# Patient Record
Sex: FEMALE | Race: WHITE
[De-identification: names, ages, dates, MRNs, and addresses within clinical notes are randomized per-mention and may not be internally consistent; named-entity substitution may affect disease eponyms.]

---

## 2018-01-30 ENCOUNTER — HOSPITAL ENCOUNTER (EMERGENCY)
Dept: HOSPITAL 80 - FED | Age: 28
Discharge: HOME | End: 2018-01-30
Payer: COMMERCIAL

## 2018-01-30 VITALS — DIASTOLIC BLOOD PRESSURE: 63 MMHG | HEART RATE: 68 BPM | SYSTOLIC BLOOD PRESSURE: 102 MMHG

## 2018-01-30 VITALS — RESPIRATION RATE: 16 BRPM | OXYGEN SATURATION: 97 % | TEMPERATURE: 98.2 F

## 2018-01-30 DIAGNOSIS — R42: Primary | ICD-10-CM

## 2018-01-30 DIAGNOSIS — E86.9: ICD-10-CM

## 2018-01-30 LAB
INR PPP: 0.98 (ref 0.83–1.16)
PLATELET # BLD: 196 10^3/UL (ref 150–400)
PROTHROMBIN TIME: 13.2 SEC (ref 12–15)

## 2018-01-30 NOTE — CPEKG
Heart Rate: 59

RR Interval: 1017

P-R Interval: 156

QRSD Interval: 82

QT Interval: 416

QTC Interval: 413

P Axis: 55

QRS Axis: 60

T Wave Axis: 44

EKG Severity - NORMAL ECG -

EKG Impression: SINUS RHYTHM

Electronically Signed By: Carl Gallo 31-Jan-2018 07:56:38

## 2018-01-30 NOTE — EDPHY
H & P


Stated Complaint: dizziness, visual changes, "tingling" nausea


HPI/ROS: 





HPI





CHIEF COMPLAINT:  Lightheadedness, I felt like was going to pass out





HISTORY OF PRESENT ILLNESS:  This patient is a  very pleasant 27-year-old 

female she has a history of schizoaffective disorder does not take any 

medications daily she presents emergency room states she feels very 

lightheaded.  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 

she got lightheaded.  She felt like she was going to pass out.  Denies any 

significant chest pain or shortness of breath. She states her vision got foggy 

when this happened.  She did not collapse.  There was no syncopal episode.  She 

has not been sick recently.  She denies any shortness of breath or pleuritic 

pain.  Denies palpitations.  She decided come the emergency room for evaluation 

of this this happened 2 hr ago.  She felt maybe she was dehydrated.  Otherwise 

not been ill.  Denies any nausea vomiting or diarrhea.





Past Medical History:  Denies significant medical history except for 

schizoaffective disorder





Past Surgical History:  Denies significant surgical history





Social History:  Denies daily use drugs alcohol tobacco products.





Family History:  Noncontributory








ROS   


REVIEW OF SYSTEMS:


A comprehensive 10 point review of systems is otherwise negative aside from 

elements mentioned in the history of present illness.








Exam   


Constitutional  appears well nontoxic, triage nursing summary reviewed, vital 

signs reviewed, awake/alert. 


Eyes   normal conjunctivae and sclera, EOMI, PERRLA. 


HENT   normal inspection, atraumatic, moist mucus membranes, no epistaxis, neck 

supple/ no meningismus, no raccoon eyes. 


Respiratory   clear to auscultation bilaterally, normal breath sounds, no 

respiratory distress, no wheezing. 


Cardiovascular   rate normal, regular rhythm, no murmur, no edema, distal 

pulses normal. 


Gastrointestinal   soft, non-tender, no rebound, no guarding, normal bowel 

sounds, no distension, no pulsatile mass. 


Genitourinary   no CVA tenderness. 


Musculoskeletal  no midline vertebral tenderness, full range of motion, no calf 

swelling, no tenderness of extremities, no meningismus, good pulses, 

neurovascularly intact.


Skin   pink, warm, & dry, no rash, skin atraumatic. 


Neurologic   awake, alert and oriented x 3, AAOx3, moves all 4 extremities 

equally, motor intact, sensory intact, CN II-XII intact, normal cerebellar, 

normal vision, normal speech. 


Psychiatric   normal mood/affect. 


Heme/Lymph/Immune   no lymphadenopathy.





Differential Diagnosis:  Includes but is not limited to in a particular order 

dehydration, electrolyte disturbance, cardiac arrhythmia, orthostatic 

hypotension





Medical Decision Making:  Plan for this patient IV establishment IV fluid bolus

, check EKG basic blood work and electrolytes, pregnancy test, and re-evaluate.

  Check orthostatic vital signs.





Re-evaluation:








EKG INTERPRETATION BY ME ON RECORD IN ev-social SYSTEM.  IMPRESSION time of 

EKG 2216, sinus rhythm rate of 59 no signs of acute ischemia or signs of 

cardiac arrhythmia.  Unremarkable EKG.





Orthostatic vital signs reviewed negative.





2326:  Re-examination at this time this patient is resting comfortably.  She 

has no complaints. Blood work is unremarkable. Vital signs are stable. 

Electrolytes are appropriate.  I have not found anything significantly or 

acutely wrong in her workup here for lightheadedness.  She is feeling better 

after IV fluids.  She ambulated well throughout the emergency room without any 

difficulty she denies any chest pain or shortness of breath denies 

lightheadedness or dizziness.  She denies feeling like she is going to pass out.





I have went over return precautions with her she understands return emergency 

room if she develops any worsening symptoms questions or concerns includes 

syncope, chest pain, shortness of breath, lightheadedness.


Source: Patient





- Personal History


LMP (Females 10-55): 8-14 Days Ago


Current Tetanus Diphtheria and Acellular Pertussis (TDAP): Yes





- Medical/Surgical History


Hx Asthma: No


Hx Chronic Respiratory Disease: No


Hx Diabetes: No


Hx Cardiac Disease: No


Hx Renal Disease: No


Hx Cirrhosis: No


Hx Alcoholism: No


Hx HIV/AIDS: No


Hx Splenectomy or Spleen Trauma: No


Other PMH: PTSD, Major Depresive Disorder, appendectomy,





- Social History


Smoking Status: Never smoked


Constitutional: 


 Initial Vital Signs











Temperature (C)  36.8 C   01/30/18 21:48


 


Heart Rate  67   01/30/18 21:48


 


Respiratory Rate  16   01/30/18 21:48


 


Blood Pressure  104/66   01/30/18 21:48


 


O2 Sat (%)  97   01/30/18 21:48








 











O2 Delivery Mode               Room Air














Allergies/Adverse Reactions: 


 





No Known Allergies Allergy (Unverified 01/30/18 21:48)


 








Home Medications: 














 Medication  Instructions  Recorded


 


NK [No Known Home Meds]  01/30/18














Medical Decision Making





- Diagnostics


Imaging Results: 


 Imaging Impressions





Chest X-Ray  01/30/18 22:09


Impression:


 


No acute thoracic abnormality. 


 














- Data Points


Laboratory Results: 


 Laboratory Results





 01/30/18 22:18 





 01/30/18 22:18 





 











  01/30/18 01/30/18 01/30/18





  22:18 22:18 22:18


 


WBC      





    


 


RBC      





    


 


Hgb      





    


 


Hct      





    


 


MCV      





    


 


MCH      





    


 


MCHC      





    


 


RDW      





    


 


Plt Count      





    


 


MPV      





    


 


Neut % (Auto)      





    


 


Lymph % (Auto)      





    


 


Mono % (Auto)      





    


 


Eos % (Auto)      





    


 


Baso % (Auto)      





    


 


Nucleat RBC Rel Count      





    


 


Absolute Neuts (auto)      





    


 


Absolute Lymphs (auto)      





    


 


Absolute Monos (auto)      





    


 


Absolute Eos (auto)      





    


 


Absolute Basos (auto)      





    


 


Absolute Nucleated RBC      





    


 


Immature Gran %      





    


 


Immature Gran #      





    


 


PT      





    


 


INR      





    


 


APTT      





    


 


D-Dimer      





    


 


Sodium      138 mEq/L mEq/L





     (135-145) 


 


Potassium      3.8 mEq/L mEq/L





     (3.5-5.2) 


 


Chloride      102 mEq/L mEq/L





     () 


 


Carbon Dioxide      22 mEq/l mEq/l





     (22-31) 


 


Anion Gap      14 mEq/L mEq/L





     (8-16) 


 


BUN      14 mg/dL mg/dL





     (7-23) 


 


Creatinine      0.7 mg/dL mg/dL





     (0.6-1.0) 


 


Estimated GFR      > 60 





    


 


Glucose      82 mg/dL mg/dL





     () 


 


Calcium      10.6 mg/dL H mg/dL





     (8.5-10.4) 


 


Magnesium      1.9 mg/dL mg/dL





     (1.6-2.3) 


 


Total Bilirubin      0.6 mg/dL mg/dL





     (0.1-1.4) 


 


Conjugated Bilirubin      0.3 mg/dL mg/dL





     (0.0-0.5) 


 


Unconjugated Bilirubin      0.3 mg/dL mg/dL





     (0.0-1.1) 


 


AST      43 IU/L IU/L





     (14-46) 


 


ALT      54 IU/L H IU/L





     (9-52) 


 


Alkaline Phosphatase      69 IU/L IU/L





     () 


 


Troponin I      < 0.012 ng/mL ng/mL





     (0.000-0.034) 


 


Total Protein      7.5 g/dL g/dL





     (6.3-8.2) 


 


Albumin      4.2 g/dL g/dL





     (3.5-5.0) 


 


Beta HCG, Qual    NEGATIVE   





    


 


Urine Color  PALE YELLOW     





    


 


Urine Appearance  HAZY     





    


 


Urine pH  6.0     





   (5.0-7.5)   


 


Ur Specific Gravity  1.005     





   (1.002-1.030)   


 


Urine Protein  NEGATIVE     





   (NEGATIVE)   


 


Urine Ketones  NEGATIVE     





   (NEGATIVE)   


 


Urine Blood  NEGATIVE     





   (NEGATIVE)   


 


Urine Nitrate  NEGATIVE     





   (NEGATIVE)   


 


Urine Bilirubin  NEGATIVE     





   (NEGATIVE)   


 


Urine Urobilinogen  NEGATIVE EU EU    





   (0.2-1.0)   


 


Ur Leukocyte Esterase  NEGATIVE     





   (NEGATIVE)   


 


Urine Glucose  NEGATIVE     





   (NEGATIVE)   


 


Urine Opiates Screen  NEGATIVE     





   (NEGATIVE)   


 


Urine Barbiturates  NEGATIVE     





   (NEGATIVE)   


 


Ur Phencyclidine Scrn  NEGATIVE     





   (NEGATIVE)   


 


Ur Amphetamine Screen  NEGATIVE     





   (NEGATIVE)   


 


U Benzodiazepines Scrn  NEGATIVE     





   (NEGATIVE)   


 


Urine Cocaine Screen  NEGATIVE     





   (NEGATIVE)   


 


U Marijuana (THC) Screen  NEGATIVE     





   (NEGATIVE)   














  01/30/18 01/30/18





  22:18 22:18


 


WBC    8.02 10^3/uL 10^3/uL





    (3.80-9.50) 


 


RBC    4.52 10^6/uL 10^6/uL





    (4.18-5.33) 


 


Hgb    14.3 g/dL g/dL





    (12.6-16.3) 


 


Hct    41.3 % %





    (38.0-47.0) 


 


MCV    91.4 fL fL





    (81.5-99.8) 


 


MCH    31.6 pg pg





    (27.9-34.1) 


 


MCHC    34.6 g/dL g/dL





    (32.4-36.7) 


 


RDW    11.7 % %





    (11.5-15.2) 


 


Plt Count    196 10^3/uL 10^3/uL





    (150-400) 


 


MPV    10.3 fL fL





    (8.7-11.7) 


 


Neut % (Auto)    55.9 % %





    (39.3-74.2) 


 


Lymph % (Auto)    34.8 % %





    (15.0-45.0) 


 


Mono % (Auto)    6.6 % %





    (4.5-13.0) 


 


Eos % (Auto)    1.7 % %





    (0.6-7.6) 


 


Baso % (Auto)    0.6 % %





    (0.3-1.7) 


 


Nucleat RBC Rel Count    0.0 % %





    (0.0-0.2) 


 


Absolute Neuts (auto)    4.48 10^3/uL 10^3/uL





    (1.70-6.50) 


 


Absolute Lymphs (auto)    2.79 10^3/uL 10^3/uL





    (1.00-3.00) 


 


Absolute Monos (auto)    0.53 10^3/uL 10^3/uL





    (0.30-0.80) 


 


Absolute Eos (auto)    0.14 10^3/uL 10^3/uL





    (0.03-0.40) 


 


Absolute Basos (auto)    0.05 10^3/uL 10^3/uL





    (0.02-0.10) 


 


Absolute Nucleated RBC    0.00 10^3/uL 10^3/uL





    (0-0.01) 


 


Immature Gran %    0.4 % %





    (0.0-1.1) 


 


Immature Gran #    0.03 10^3/uL 10^3/uL





    (0.00-0.10) 


 


PT  13.2 SEC SEC  





   (12.0-15.0)  


 


INR  0.98   





   (0.83-1.16)  


 


APTT  29.0 SEC SEC  





   (23.0-38.0)  


 


D-Dimer  < 0.27 ug/mLFEU ug/mLFEU  





   (0.00-0.50)  


 


Sodium    





   


 


Potassium    





   


 


Chloride    





   


 


Carbon Dioxide    





   


 


Anion Gap    





   


 


BUN    





   


 


Creatinine    





   


 


Estimated GFR    





   


 


Glucose    





   


 


Calcium    





   


 


Magnesium    





   


 


Total Bilirubin    





   


 


Conjugated Bilirubin    





   


 


Unconjugated Bilirubin    





   


 


AST    





   


 


ALT    





   


 


Alkaline Phosphatase    





   


 


Troponin I    





   


 


Total Protein    





   


 


Albumin    





   


 


Beta HCG, Qual    





   


 


Urine Color    





   


 


Urine Appearance    





   


 


Urine pH    





   


 


Ur Specific Gravity    





   


 


Urine Protein    





   


 


Urine Ketones    





   


 


Urine Blood    





   


 


Urine Nitrate    





   


 


Urine Bilirubin    





   


 


Urine Urobilinogen    





   


 


Ur Leukocyte Esterase    





   


 


Urine Glucose    





   


 


Urine Opiates Screen    





   


 


Urine Barbiturates    





   


 


Ur Phencyclidine Scrn    





   


 


Ur Amphetamine Screen    





   


 


U Benzodiazepines Scrn    





   


 


Urine Cocaine Screen    





   


 


U Marijuana (THC) Screen    





   











Medications Given: 


 








Discontinued Medications





Sodium Chloride (Ns)  1,000 mls @ 0 mls/hr IV EDNOW ONE; Wide [facility]


   PRN Reason: Protocol


   Stop: 01/30/18 22:10


   Last Admin: 01/30/18 22:16 Dose:  1,000 mls








Departure





- Departure


Disposition: Home, Routine, Self-Care


Clinical Impression: 


 Light-headed





Condition: Good


Instructions:  Near Syncope (ED), Lightheadedness (ED)


Additional Instructions: 


1.  Return emergency room if you have any worsening symptoms questions or 

concerns includes chest pain, shortness of breath, passing out.


2.  Stay well-hydrated.


Referrals: 


Guerline Rivera MD [Primary Care Provider] - As per Instructions